# Patient Record
Sex: MALE | Race: WHITE
[De-identification: names, ages, dates, MRNs, and addresses within clinical notes are randomized per-mention and may not be internally consistent; named-entity substitution may affect disease eponyms.]

---

## 2017-01-21 ENCOUNTER — HOSPITAL ENCOUNTER (OUTPATIENT)
Dept: HOSPITAL 47 - RADPETMAIN | Age: 67
Discharge: HOME | End: 2017-01-21
Payer: MEDICARE

## 2017-01-21 DIAGNOSIS — C34.90: Primary | ICD-10-CM

## 2017-01-21 PROCEDURE — 78815 PET IMAGE W/CT SKULL-THIGH: CPT

## 2017-01-22 NOTE — PE
EXAMINATION TYPE: PET CT fusion skull to thigh

 

DATE OF EXAM: 1/21/2017 1:02 PM

 

COMPARISON: PET CT 9/3/2016

 

HISTORY: Lung cancer   

 

TECHNIQUE:  Following the intravenous administration of 12.62 mCi of F-18 FDG, whole body images are 
performed from the skull base to the midthigh.  Images are reviewed on the computer in the coronal, a
xial, and sagittal planes.  Reconstructed rotating images are created on independent workstation and 
reviewed on the computer.   A localization and attenuation correction CT is performed in conjunction 
with the PET scan.

 

DLP: 257.04 mGycm

 

SCAN: Subsequent Scan

 

Blood glucose: 147 mg/dL

 

Average Mediastinum SUV: 1.1

Average Liver SUV: 1.4

 

FINDINGS: 

NECK:  Suspicious soft tissue uptake is not evident.

 

THORAX: Abnormal uptake is along the posterior lateral wall of the cavitary lesion of the left lung. 
This has an SUV value of 3.0 suspicious for neoplasm. This appears hyperintense on the current examin
ation, a new finding.

 

ABDOMEN AND PELVIS: No abnormal soft tissue uptake.

 

OSSEOUS STRUCTURES: Previous intense uptake within the osseous structures evident on the current exam
ination. There is a small focus of radiotracer posterior to the proximal diaphyseal left femur. PET i
mage 249. This is an SUV value of 2.9. Acute injury could be considered. Given the SUV values neoplas
tic process is not excluded.

 

LOCALIZATION CT: Cavitary lesion posterior medial left midlung is again identified. Some streak opaci
ties in the posterior left lung base without corresponding abnormal uptake

 

COMPARISON: Osseous uptake is markedly diminished over the interval.

 

IMPRESSION:

1. Other remains abnormal uptake around the cavitary lesion posterior medial left midlung compatible 
with neoplasm. The uptake in this region may be increasing from comparison.

2. The previous osseous uptake throughout the axial and appendicular skeleton has resolved. No suspic
ious residual is evident.

3. Small focal area of radiotracer accumulation posterior proximal left diaphyseal femur. Injury infl
ammatory change from the injury could be considered. Neoplasm is not excluded given the SUV value.

## 2017-01-27 ENCOUNTER — HOSPITAL ENCOUNTER (OUTPATIENT)
Dept: HOSPITAL 47 - RADMRIMAIN | Age: 67
Discharge: HOME | End: 2017-01-27
Payer: MEDICARE

## 2017-01-27 DIAGNOSIS — G31.9: ICD-10-CM

## 2017-01-27 DIAGNOSIS — I67.82: ICD-10-CM

## 2017-01-27 DIAGNOSIS — C34.90: Primary | ICD-10-CM

## 2017-01-27 LAB — NON-AFRICAN AMERICAN GFR(MDRD): >60

## 2017-01-27 PROCEDURE — 70553 MRI BRAIN STEM W/O & W/DYE: CPT

## 2017-01-27 PROCEDURE — 82565 ASSAY OF CREATININE: CPT

## 2017-01-27 NOTE — MR
EXAMINATION TYPE: MR brain wo/w con

 

DATE OF EXAM: 1/27/2017 12:54 PM

 

COMPARISON: PET/CT 1/21/2017 and previous MRI 4/8/2016

 

HISTORY: 67 year-old male history of Lung Ca, evaluate for metastatic disease.

 

TECHNIQUE:  Multiplanar, multisequence images of the brain and brainstem were acquired before and aft
er administration of  10 mL IV MultiHance.  Diffusion weighted imaging is performed. 

 

FINDINGS:  

No evidence for acute infarction, hemorrhage, mass, mass effect, midline shift, herniation, effacemen
t of basal cisterns, or extra-axial fluid collection. 

 

The ventricles and sulci are age appropriate with mild generalized atrophy.

 

Major intracranial flow voids are intact.

 

T2/FLAIR weighted sequences show mild scattered burden of right white matter change in the subcortica
l, deep, and periventricular white matter of both cerebral hemispheres. There are approximately 5-10 
foci on each side without significant change.

 

Midline structures demonstrate normal morphology.  The craniocervical junction is normal. 

 

Post contrast images demonstrate no evidence of pathologic enhancement.  Dural venous sinuses are pat
ent.

 

The visualized sinuses are clear and the globes are intact. Leftward nasal septal deviation. Small am
ount of opacification within the right mastoid air cells.

 

 

IMPRESSION:

 

1. No suspicious intracranial enhancement to suggest brain metastases. No abnormality seen on diffusi
on.

2. Stable mild atrophy and changes of chronic small vessel ischemic disease.

3. Partial opacification of right mastoid air cells could represent retained secretions. Correlate fo
r any mastoid pain to exclude mastoiditis.

## 2017-05-20 ENCOUNTER — HOSPITAL ENCOUNTER (OUTPATIENT)
Dept: HOSPITAL 47 - RADPETMAIN | Age: 67
Discharge: HOME | End: 2017-05-20
Payer: MEDICARE

## 2017-05-20 DIAGNOSIS — C34.90: Primary | ICD-10-CM

## 2017-05-20 PROCEDURE — 78815 PET IMAGE W/CT SKULL-THIGH: CPT

## 2017-05-22 NOTE — PE
EXAMINATION TYPE: PET CT fusion skull to thigh

 

DATE OF EXAM: 5/20/2017 2:51 PM

 

CLINICAL HISTORY: 67-year-old male restaging lung cancer, last chemotherapy on 8/30/2016.

 

TECHNIQUE:  Following the intravenous administration of  13.54 mCi of F-18 FDG, whole body images are
 performed from the skull base to the midthigh.  Images are reviewed on the computer in the coronal, 
axial, and sagittal planes.  Reconstructed rotating images are created on independent workstation and
 reviewed on the computer.   A localization and attenuation correction CT is performed in conjunction
 with the PET scan.

 

Glucose level: 88 mg/dL

CTDI- 2.4 mGy

DLP- 213.33 mGy/cm 

 

COMPARISON: 1/21/2017 and 9/3/2016

 

 

FINDINGS: 

 

PET:

There are 2 new AP window lymph nodes, one measuring 1 cm and the second measuring 6 mm, maximal SUV 
8.3.

 

There is additional new left hilar lymph node characterized by intense FDG uptake, maximal SUV 9.6.

 

Redemonstrated cavitary lesion at the superior segment left lower lobe. There has been interval marke
d increase in the soft tissue component along the posterior and medial wall abutting the aorta and le
ft paravertebral region. This measures up to 3.7 cm wide and 2.7 cm AP and shows very intense FDG upt
dante, maximal SUV 16.4.

 

There is a 1.1 cm spiculated pulmonary nodule overlying the left hemidiaphragm that shows increased s
ize but only minimal to mild FDG uptake, max SUV 2.3.

 

A couple 6 mm left lower lobe pulmonary nodules, axial image 114 and 117 are unchanged from 7/9/2016.
  A 4 mm subpleural right middle lobe pulmonary nodule is unchanged as well. 

 

Physiologic FDG uptake within the abdomen and pelvis.

 

At the site of previous increased uptake within the proximal left femur, CT now shows a lytic lesion 
measuring 1.1 cm wide destroying the posterior cortex of the subtrochanteric region. Now intense FDG 
uptake, maximal SUV 17.5 versus 2.9, previously.

 

 

 

ATTENUATION CORRECTION CT:

Paranasal sinuses and mastoid air cells are well pneumatized. Atherosclerotic calcifications within t
he carotid bifurcations. No cervical lymphadenopathy.

 

Heart is normal size without pericardial effusion. Coronary vessel calcifications are present and are
 remarkable for coronary artery disease. Aorta is normal caliber with mild atherosclerotic arch calci
fications and conventional arch vessel branching anatomy.

 

Chronic narrowing of the left lower lobe bronchus. COPD with mild centrilobular emphysema and mild di
ffuse bronchial wall thickening. 

 

No dilated small bowel, free fluid, or free air. No mesenteric or retroperitoneal lymphadenopathy. Mo
derate prostatic calcifications within the abdominal aorta. Left hemicolonic diverticulosis, greatest
 in the sigmoid colon without pericolonic inflammatory change.

 

Mild circumferential bladder wall thickening similar to prior; correlate to exclude cystitis. Patulou
s left inguinal canal. No abnormal fluid collection in the pelvis or pelvic lymphadenopathy seen.

 

Bones: Mild degenerative changes of the hips. Degenerative changes lower lumbar spine.

 

 

IMPRESSION: 

 

1. Disease recurrence with a 3.7 cm intensely hypermetabolic neoplasm arising along the posterior and
 medial wall of the patient's superior segment left lower lobe cavity.

2. Metastatic left hilar and AP window lymphadenopathy is new.

3. Slight enlargement of a 1.1 cm spiculated nodule at the left base which has minimal to mild FDG up
take. Continued followup recommended to exclude an early metachronous lung cancer.

4. Increasing hypermetabolism within the proximal left femur now associated with focal lytic destruct
ion of the subtrochanteric posterior cortex. Finding compatible with osseous metastasis. Note that th
e patient is at risk for impending pathologic fracture and should be made nonweightbearing. Orthopedi
c consultation advised.  An Orange message has been communicated to Sukumar Wilson MD via the [employer] Critical Result system on 5/21/2017 7:41 PM, Message ID [id number].

## 2017-07-25 ENCOUNTER — HOSPITAL ENCOUNTER (OUTPATIENT)
Dept: HOSPITAL 47 - RADXRMAIN | Age: 67
Discharge: HOME | End: 2017-07-25
Payer: MEDICARE

## 2017-07-25 DIAGNOSIS — M25.552: ICD-10-CM

## 2017-07-25 DIAGNOSIS — E11.9: ICD-10-CM

## 2017-07-25 DIAGNOSIS — R42: ICD-10-CM

## 2017-07-25 DIAGNOSIS — R51: ICD-10-CM

## 2017-07-25 DIAGNOSIS — C34.32: ICD-10-CM

## 2017-07-25 DIAGNOSIS — M89.8X8: Primary | ICD-10-CM

## 2017-07-25 NOTE — XR
EXAMINATION TYPE: XR femur LT

 

DATE OF EXAM: 7/25/2017

 

CLINICAL HISTORY: pain

 

TECHNIQUE:  Two views of the left femur are obtained.

 

COMPARISON: None.

 

FINDINGS:  There is no acute fracture or dislocation seen of the  femur. There is a lytic lesion with
in the subtrochanteric region of the left femur measuring 2.9 x 1.5 cm compatible with metastatic les
ion. No additional lesions identified within the field-of-view.

 

IMPRESSION:  

Lytic lesion subtrochanteric region left femur.

 

ICD 10 NO FRACTURE, INITIAL EVALUATION

## 2017-09-02 ENCOUNTER — HOSPITAL ENCOUNTER (OUTPATIENT)
Dept: HOSPITAL 47 - RADPETMAIN | Age: 67
Discharge: HOME | End: 2017-09-02
Payer: MEDICARE

## 2017-09-02 DIAGNOSIS — C34.32: Primary | ICD-10-CM

## 2017-09-02 PROCEDURE — 78815 PET IMAGE W/CT SKULL-THIGH: CPT

## 2017-09-03 NOTE — PE
EXAMINATION TYPE: PET CT fusion skull to thigh

 

DATE OF EXAM: 9/2/2017

 

COMPARISON: Prior PET/CT May 20, 2017 and older studies.

 

HISTORY: Lung cancer progress study   completed chemotherapy August 29, 2016

 

TECHNIQUE:  Following the intravenous administration of 14.97 mCi of F-18 FDG, whole body images are 
performed from the skull base to the midthigh.  Images are reviewed on the computer in the coronal, a
xial, and sagittal planes.  Reconstructed rotating images are created on independent workstation and 
reviewed on the computer.   A localization and attenuation correction CT is performed in conjunction 
with the PET scan.

 

SCAN: Subsequent Scan

 

FINDINGS: 

 

SKULL BASE AND NECK:  No suspicious hypermetabolic uptake is seen in the neck on current study.

 

CHEST, MEDIASTINUM, AND HILAR REGION: There are persistent abnormal lymph nodes in the AP window. Lar
gest better visualize lymph node measures 1.4 x 0.8 cm on axial image 86 is not significantly changed
 in size from prior study, max SUV is 4.24 on current study.

 

Previously visualized hypermetabolic left hilar lymph node on axial image 93 is less prominent withou
t abnormal hypermetabolic uptake on current study near axial image 96.

 

There is persistent medial superior left lower lobe hypermetabolic lesion near axial image 91 posteri
or to descending aorta invading pleura measuring roughly 1.8 x 1.7 cm fairly stable in size with dimi
nished hypermetabolic uptake versus prior. Max SUV is 6.55. There is persistent curvilinear hypermeta
bolic uptake inferior to this axial image 97 which now shows adjacent ossific destruction suggesting 
progression. In addition there is new lytic hypermetabolic lesion in the left lateral thoracic verteb
ra on axial image 91 .

 

There is stable 6 x 7 mm spiculated ametabolic left lower lobe nodule on axial image 117. There is st
able ametabolic 4 mm subpleural nodule left lower lobe on axial image 119. There is stable ametabolic
 9 mm left basilar nodule on axial image 130. There is stable ametabolic 4 mm subpleural nodule right
 middle lobe on axial image 106. There is background mild to borderline moderate emphysematous change
 redemonstrated.

 

ABDOMEN AND PELVIS: No suspicious hypermetabolic uptake is seen in the abdomen or pelvis. Sigmoid col
onic diverticulosis is present. There is some wall thickening in the mid sigmoid colon with mild hype
rmetabolic uptake near axial image 216, an acute diverticulitis cannot be excluded at this level. Cli
nical correlation is advised.

 

OSSEOUS STRUCTURES: There is artifact from fixation hardware in the left proximal femur. Mild surroun
ding inflammatory changes likely product of recent surgery at this level. Hypermetabolic focus poster
ior cortex near image 240 is redemonstrated with diminished max SUV from prior study noted. Max SUV i
s 5.13 on current study.

 

OTHER CT: There is moderate to severe calcified plaque at bilateral carotid bulbs redemonstrated.

 

There is redemonstration of coronary artery calcification which is noted marker for coronary artery d
isease.

 

There is redemonstration of bilateral gynecomastia.

 

There is fairly moderate to severe calcified plaque of the abdominal aorta extending into pelvic bran
ch vessels.

 

There is felt stable mild concentric wall thickening of bladder.

 

There is prominent multilevel facet arthropathy in the lower lumbar spine. There is multilevel spurri
ng in the thoracic spine noted.

 

IMPRESSION: 

1. Overall mixed response as detailed above in the thorax. Some areas of improvement and progression 
noted.

2. Probable mild to moderate acute sigmoid colonic diverticulitis. Clinical correlation advised.

 

A Yellow message has been communicated to Sukumar Wilson MD via the [employer] Critical Result
 system on 9/3/2017 7:58 AM, Message ID [id number].

## 2017-12-09 ENCOUNTER — HOSPITAL ENCOUNTER (OUTPATIENT)
Dept: HOSPITAL 47 - RADPETMAIN | Age: 67
Discharge: HOME | End: 2017-12-09
Payer: MEDICARE

## 2017-12-09 DIAGNOSIS — Z53.9: Primary | ICD-10-CM

## 2018-03-10 ENCOUNTER — HOSPITAL ENCOUNTER (OUTPATIENT)
Dept: HOSPITAL 47 - RADPETMAIN | Age: 68
End: 2018-03-10
Payer: MEDICARE

## 2018-03-10 DIAGNOSIS — C34.32: Primary | ICD-10-CM

## 2018-03-10 PROCEDURE — 78815 PET IMAGE W/CT SKULL-THIGH: CPT

## 2018-03-10 NOTE — PE
EXAMINATION TYPE: PET CT fusion skull to thigh

 

DATE OF EXAM: 3/10/2018

 

CLINICAL HISTORY: Lung cancer progress study. Completed chemotherapy May 7, 2018.

 

TECHNIQUE:  Following the intravenous administration of  13.57 mCi of F-18 FDG, whole body images are
 performed from the skull base to the midthigh.  Images are reviewed on the computer in the coronal, 
axial, and sagittal planes.  Reconstructed rotating images are created on independent workstation and
 reviewed on the computer.   A non-contrast CT is performed in conjunction with the PET scan.

 

COMPARISON: Prior PET/CT December 16, 2017 and older studies.

 

Subsequent scan

 

FINDINGS: 

SKULL BASE AND NECK:  No suspicious hypermetabolic uptake is seen in the neck on current study.

 

CHEST, MEDIASTINUM, AND HILAR REGION: There is background mild to moderate underlying emphysematous c
hange redemonstrated. Subcentimeter AP window lymph nodes axial image 85 are unchanged in size and ap
pearance without suspicious hypermetabolic uptake on current study stable from most recent prior.

 

There is persistent curvilinear soft tissue left periaortic region axial image 90 measuring 1.5 x 1.7
 cm with increased hypermetabolic uptake, max SUV is 7.1. There is adjacent possibly contiguous 11 x 
9 mm spiculated nodule or nodular consolidation that is more prominent from prior exam measuring 1.3 
x 1.1 cm axial image 89, max SUV at this level is 4.73. Both findings are consistent with disease pro
gression. This is immediately posterior to a thin-walled cyst or cystic lesion. There is a new small 
focal area of hypermetabolic pleural nodular thickening axial image 94 Max SUV of 4.46 not clearly se
en on prior study. 

 

No additional areas of suspicious hypermetabolic uptake identified on current study.

 

ABDOMEN AND PELVIS: No suspicious hypermetabolic uptake is seen on current study.

 

OSSEOUS STRUCTURES: No suspicious hypermetabolic uptake is present with particular attention to left 
proximal femur at area of prior suspicious lesion on older studies.

 

OTHER CT: Moderate to severe calcified plaque in bilateral carotid bulbs remains present, left greate
r than right. 

 

There is three-vessel coronary artery calcification redemonstrated which is noted marker for coronary
 artery disease. 

 

There is improved left basilar linear scarring and/or atelectasis. Bilateral gynecomastia is again se
en. 

 

There is fairly moderate to severe calcified plaque of the aorta extending into the iliac branch vess
els redemonstrated. 

 

There are scattered colonic diverticula most prominent in the left and sigmoid colon. No convincing C
T evidence of acute diverticulitis currently. 

 

Multilevel spurring in the spine is redemonstrated. There is facet arthropathy mid to lower lumbar le
vels redemonstrated. Metallic hardware from left femur internal fixation is redemonstrated.

 

IMPRESSION: Overall negative response with increase in size and abnormal hypermetabolic uptake of lef
t suprahilar posterior local malignancy as detailed above. No new metastatic disease however is prese
nt.

## 2018-06-09 ENCOUNTER — HOSPITAL ENCOUNTER (OUTPATIENT)
Dept: HOSPITAL 47 - RADPETMAIN | Age: 68
End: 2018-06-09
Payer: MEDICARE

## 2018-06-09 DIAGNOSIS — J43.9: ICD-10-CM

## 2018-06-09 DIAGNOSIS — C34.32: Primary | ICD-10-CM

## 2018-06-09 DIAGNOSIS — J98.4: ICD-10-CM

## 2018-06-09 PROCEDURE — 78815 PET IMAGE W/CT SKULL-THIGH: CPT

## 2018-06-11 NOTE — PE
EXAMINATION TYPE: PET CT fusion skull to thigh

 

DATE OF EXAM: 6/9/2018

 

CLINICAL HISTORY: Lung cancer progress study. Completed chemotherapy March 20, 2018

 

TECHNIQUE:  Following the intravenous administration of  15.288 mCi of F-18 FDG, whole body images ar
e performed from the skull base to the midthigh.  Images are reviewed on the computer in the coronal,
 axial, and sagittal planes.  Reconstructed rotating images are created on independent workstation an
d reviewed on the computer.   A non-contrast CT is performed in conjunction with the PET scan.

 

COMPARISON: Prior PET/CT March 10, 2018 and older studies.

 

Subsequent scan 

 

FINDINGS: 

SKULL BASE AND NECK:  No new areas of suspicious hypermetabolic uptake are seen.

 

CHEST, MEDIASTINUM, AND HILAR REGION: Background underlying emphysematous change is redemonstrated. S
table subcentimeter AP window lymph nodes without abnormal hypermetabolic uptake are redemonstrated n
ear axial image 85.

 

There is persistent hypermetabolic curvilinear soft tissue left para-aortic region more prominent or 
enlarged from prior study measuring 3.5 x 2.9 cm axial image 90, max SUV is 6.4 to current study. The
 adjacent spiculated nodule is now contiguous lateral aspect with increasing hypermetabolic uptake ax
ial image 89, max SUV is 6.79 from 4.73 prior study. This is immediately posterior to a thin-walled c
yst. There is enlarging pleural-based focus axial image 97 posterior to aorta now measuring 2.1 x 1.5
 cm axial image 97 with increasing max SUV of 9.63 from 4.46 prior study which is now destroying the 
articulating portion of the left posterior seventh rib.

 

There is suggestion of new subcentimeter spiculated hypermetabolic lesion left lung base axial image 
131 CT with max SUV of 3.85 on PET/CT image 136.

 

No new areas of abnormal hypermetabolic uptake are otherwise seen.

 

ABDOMEN AND PELVIS: There is new 1.2 cm hypermetabolic focus axial image 151 max SUV is 6.44. This co
uld be inferior central crux of left adrenal gland versus left periaortic lymph node.

 

OSSEOUS STRUCTURES: New suspicious lytic hypermetabolic focus posterior aspect of left proximal femur
 subtrochanteric level axial image 245 is noted, max SUV is 6.52.  Mild hypermetabolic uptake intertr
ochanteric level is favored postsurgical superior to this.

 

OTHER CT: Multilevel spurring in the spine is seen. Multilevel facet arthropathy lower lumbar levels 
is seen. There is arthropathy in both shoulders at the AC joints. There is old left clavicular fractu
re redemonstrated.

 

There is moderate to severe calcified plaque bilateral carotid bulbs redemonstrated.

 

There is moderate coronary artery calcification redemonstrated.

 

Diverticula in the sigmoid colon are again seen.

 

IMPRESSION: Findings consistent with neoplastic progression identified. Enlarging and increasing hype
rmetabolic lesions noted left midlung. New left basilar subcentimeter lesion, lesion region of inferi
or left adrenal gland, and new osseous metastatic lesion subtrochanteric level left hip also all note
d.

 

EORTC response criteria:| Progressive Metabolic Disease. Increase in MaxSUV > 25% 

 

Primary tumor response WHO category:  PD - At least 20% increase in longest recordable dimension of t
umor

## 2018-10-27 ENCOUNTER — HOSPITAL ENCOUNTER (OUTPATIENT)
Dept: HOSPITAL 47 - RADPETMAIN | Age: 68
Discharge: HOME | End: 2018-10-27
Attending: INTERNAL MEDICINE
Payer: MEDICARE

## 2018-10-27 DIAGNOSIS — C34.32: ICD-10-CM

## 2018-10-27 DIAGNOSIS — C79.51: Primary | ICD-10-CM

## 2018-10-27 PROCEDURE — 78815 PET IMAGE W/CT SKULL-THIGH: CPT

## 2018-10-28 NOTE — PE
EXAMINATION TYPE: PET CT fusion skull to thigh

 

DATE OF EXAM: 10/27/2018

 

COMPARISON: Prior PET/CT June 19, 2018 and older studies.

 

HISTORY: Lung cancer progress study.  Completed immunotherapy October 25, 2018. Originally diagnosed 
2016. 

 

TECHNIQUE:  Following the intravenous administration of 13.98 mCi of F-18 FDG, whole body images are 
performed from the skull base to the midthigh.  Images are reviewed on the computer in the coronal, a
xial, and sagittal planes.  Reconstructed rotating images are created on independent workstation and 
reviewed on the computer.   A noncontrast CT is performed in conjunction with the PET scan.

 

SCAN: Subsequent Scan

 

FINDINGS: 

 

SKULL BASE AND NECK:  No new areas of suspicious hypermetabolic uptake are seen.

 

CHEST, MEDIASTINUM, AND HILAR REGION: A background of moderate underlying emphysematous change most p
rominent in lung apices is redemonstrated.

 

There is progression in curvilinear soft tissue left para-aortic region now measuring roughly 4.9 x 3
.1 cm axial image 104 with some destruction of the posterior seventh rib and invasion into the adjace
nt neural foramina abutting spinal canal, max SUV is 10.05 on current study. There is destruction of 
posterior left aspect of the T7 vertebra on axial image 107 and left T7 transverse process. Soft tiss
ue extends to level of spiculated 2.0 x 1.7 cm nodule superiorly axial image 100 max SUV at this leve
l of 4.31. Craniocaudal length of neoplasm is roughly 6 cm.

 

There is stable ametabolic 5 mm subpleural nodule left lung base axial image 121. Left basilar scarri
ng near axial image 135 is stable without progression or hypermetabolic uptake. No new areas of abnor
mal hypermetabolic uptake are seen. There is enlarging 8 x 5 mm left paravertebral nodule or mass wit
h mild hypermetabolic uptake, this needs to be followed axial image 131.

 

ABDOMEN AND PELVIS: There is new hypermetabolic 9 x 8 mm retrocrural lymph node just posterior to aor
ta just past diaphragmatic ramona axial image 146, max SUV is 4.25. Posterior to the spleen there is ne
w hypermetabolic just below diaphragm measuring 3.5 x 1.1 cm axial image 148, max SUV is 5.33.

 

There is interval progression in size of left para-aortic lymph node now measuring 1.8 x 1.4 cm on ax
ial image 159, max SUV is 4.76.

 

OSSEOUS STRUCTURES: There is further enlargement of posterior suspicious lytic hypermetabolic lesion 
left proximal femur subtrochanteric level axial image 255, max SUV is 8.7 on current study. Metallic 
hardware redemonstrated. Mild hypermetabolic uptake proximal to this is redemonstrated felt stable.

 

There is suggestion of new hypermetabolic osseous lesion left T12 level axial image 149 without defin
itive CT lytic or sclerotic lesion.

 

OTHER CT: Multilevel spurring in the spine is seen. Multilevel facet arthropathy lower lumbar levels 
is redemonstrated. There is arthropathy in both shoulders at the AC joints. There is old left clavicu
lar fracture redemonstrated. 

 

There is moderate to severe calcified plaque bilateral carotid bulbs redemonstrated. 

 

There is moderate coronary artery calcification redemonstrated. 

 

Diverticula in the sigmoid colon are again seen.

 

 

IMPRESSION: Findings consistent with neoplastic progression redemonstrated. Increasing size of conflu
ent destructive posterior left mid thoracic lesion now invading spinal canal. New nodules and/or rhoda
opathy in the upper to midabdomen. Progression of osseous metastatic disease.

 

A Yellow [employer] critical message alert has been initiated for Sukumar Wilson MD via the [employer] | Critical Results System on 10/28/2018 6:14 AM.  This message alert has been sent to Sukumar Wilson MD via the preferences provided by the clinician for the receipt of Radiology Critical Findings. Me
ssage ID [id number].

## 2018-12-05 ENCOUNTER — HOSPITAL ENCOUNTER (INPATIENT)
Dept: HOSPITAL 47 - 3NMEDONC | Age: 68
LOS: 3 days | Discharge: HOME HEALTH | DRG: 809 | End: 2018-12-08
Attending: INTERNAL MEDICINE | Admitting: INTERNAL MEDICINE
Payer: MEDICARE

## 2018-12-05 VITALS — BODY MASS INDEX: 17.1 KG/M2

## 2018-12-05 DIAGNOSIS — I10: ICD-10-CM

## 2018-12-05 DIAGNOSIS — C77.1: ICD-10-CM

## 2018-12-05 DIAGNOSIS — Z96.1: ICD-10-CM

## 2018-12-05 DIAGNOSIS — T45.1X5A: ICD-10-CM

## 2018-12-05 DIAGNOSIS — R13.10: ICD-10-CM

## 2018-12-05 DIAGNOSIS — M47.9: ICD-10-CM

## 2018-12-05 DIAGNOSIS — F17.210: ICD-10-CM

## 2018-12-05 DIAGNOSIS — Z98.41: ICD-10-CM

## 2018-12-05 DIAGNOSIS — Z79.899: ICD-10-CM

## 2018-12-05 DIAGNOSIS — E86.0: ICD-10-CM

## 2018-12-05 DIAGNOSIS — M19.012: ICD-10-CM

## 2018-12-05 DIAGNOSIS — Z98.42: ICD-10-CM

## 2018-12-05 DIAGNOSIS — E11.40: ICD-10-CM

## 2018-12-05 DIAGNOSIS — H93.13: ICD-10-CM

## 2018-12-05 DIAGNOSIS — C34.90: ICD-10-CM

## 2018-12-05 DIAGNOSIS — R39.11: ICD-10-CM

## 2018-12-05 DIAGNOSIS — M19.011: ICD-10-CM

## 2018-12-05 DIAGNOSIS — G93.49: ICD-10-CM

## 2018-12-05 DIAGNOSIS — D61.810: ICD-10-CM

## 2018-12-05 DIAGNOSIS — B37.0: ICD-10-CM

## 2018-12-05 DIAGNOSIS — R30.0: ICD-10-CM

## 2018-12-05 DIAGNOSIS — C79.51: ICD-10-CM

## 2018-12-05 DIAGNOSIS — J44.9: ICD-10-CM

## 2018-12-05 DIAGNOSIS — Z88.1: ICD-10-CM

## 2018-12-05 DIAGNOSIS — R50.81: ICD-10-CM

## 2018-12-05 DIAGNOSIS — Z79.84: ICD-10-CM

## 2018-12-05 DIAGNOSIS — Z86.010: ICD-10-CM

## 2018-12-05 DIAGNOSIS — D70.1: Primary | ICD-10-CM

## 2018-12-05 DIAGNOSIS — Z82.49: ICD-10-CM

## 2018-12-05 LAB
ALBUMIN SERPL-MCNC: 2.8 G/DL (ref 3.5–5)
ALP SERPL-CCNC: 103 U/L (ref 38–126)
ALT SERPL-CCNC: 34 U/L (ref 21–72)
ANION GAP SERPL CALC-SCNC: 8 MMOL/L
AST SERPL-CCNC: 22 U/L (ref 17–59)
BUN SERPL-SCNC: 14 MG/DL (ref 9–20)
CALCIUM SPEC-MCNC: 7.6 MG/DL (ref 8.4–10.2)
CHLORIDE SERPL-SCNC: 101 MMOL/L (ref 98–107)
CO2 SERPL-SCNC: 24 MMOL/L (ref 22–30)
ERYTHROCYTE [DISTWIDTH] IN BLOOD BY AUTOMATED COUNT: 3.33 M/UL (ref 4.3–5.9)
ERYTHROCYTE [DISTWIDTH] IN BLOOD: 12.8 % (ref 11.5–15.5)
GLUCOSE BLD-MCNC: 159 MG/DL (ref 75–99)
GLUCOSE SERPL-MCNC: 172 MG/DL (ref 74–99)
HCT VFR BLD AUTO: 31.4 % (ref 39–53)
HGB BLD-MCNC: 10 GM/DL (ref 13–17.5)
MAGNESIUM SPEC-SCNC: 1.5 MG/DL (ref 1.6–2.3)
MCH RBC QN AUTO: 29.9 PG (ref 25–35)
MCHC RBC AUTO-ENTMCNC: 31.7 G/DL (ref 31–37)
MCV RBC AUTO: 94.3 FL (ref 80–100)
PLATELET # BLD AUTO: 245 K/UL (ref 150–450)
POTASSIUM SERPL-SCNC: 3 MMOL/L (ref 3.5–5.1)
PROT SERPL-MCNC: 5.3 G/DL (ref 6.3–8.2)
SODIUM SERPL-SCNC: 133 MMOL/L (ref 137–145)
WBC # BLD AUTO: 0.9 K/UL (ref 3.8–10.6)

## 2018-12-05 PROCEDURE — 84100 ASSAY OF PHOSPHORUS: CPT

## 2018-12-05 PROCEDURE — 87040 BLOOD CULTURE FOR BACTERIA: CPT

## 2018-12-05 PROCEDURE — 74230 X-RAY XM SWLNG FUNCJ C+: CPT

## 2018-12-05 PROCEDURE — 81003 URINALYSIS AUTO W/O SCOPE: CPT

## 2018-12-05 PROCEDURE — 80048 BASIC METABOLIC PNL TOTAL CA: CPT

## 2018-12-05 PROCEDURE — 71046 X-RAY EXAM CHEST 2 VIEWS: CPT

## 2018-12-05 PROCEDURE — 83735 ASSAY OF MAGNESIUM: CPT

## 2018-12-05 PROCEDURE — 85025 COMPLETE CBC W/AUTO DIFF WBC: CPT

## 2018-12-05 PROCEDURE — 84132 ASSAY OF SERUM POTASSIUM: CPT

## 2018-12-05 PROCEDURE — 70553 MRI BRAIN STEM W/O & W/DYE: CPT

## 2018-12-05 PROCEDURE — 83605 ASSAY OF LACTIC ACID: CPT

## 2018-12-05 PROCEDURE — 80053 COMPREHEN METABOLIC PANEL: CPT

## 2018-12-05 RX ADMIN — SODIUM CHLORIDE ONE MLS/HR: 9 INJECTION, SOLUTION INTRAVENOUS at 19:47

## 2018-12-05 RX ADMIN — TIMOLOL MALEATE SCH DROPS: 5 SOLUTION OPHTHALMIC at 21:17

## 2018-12-05 RX ADMIN — LATANOPROST SCH DROPS: 50 SOLUTION OPHTHALMIC at 21:17

## 2018-12-05 RX ADMIN — POTASSIUM CHLORIDE SCH MEQ: 20 TABLET, EXTENDED RELEASE ORAL at 21:17

## 2018-12-05 RX ADMIN — CEFAZOLIN SCH MLS/HR: 330 INJECTION, POWDER, FOR SOLUTION INTRAMUSCULAR; INTRAVENOUS at 20:07

## 2018-12-05 RX ADMIN — GABAPENTIN SCH MG: 300 CAPSULE ORAL at 21:17

## 2018-12-05 RX ADMIN — FILGRASTIM-SNDZ SCH MCG: 300 INJECTION, SOLUTION INTRAVENOUS; SUBCUTANEOUS at 18:25

## 2018-12-05 RX ADMIN — SODIUM CHLORIDE ONE MLS/HR: 9 INJECTION, SOLUTION INTRAVENOUS at 20:07

## 2018-12-05 RX ADMIN — MAGNESIUM SULFATE IN DEXTROSE SCH MLS/HR: 10 INJECTION, SOLUTION INTRAVENOUS at 22:50

## 2018-12-05 RX ADMIN — VERAPAMIL HYDROCHLORIDE SCH MG: 40 TABLET, FILM COATED ORAL at 21:17

## 2018-12-05 RX ADMIN — POTASSIUM CHLORIDE SCH MEQ: 20 TABLET, EXTENDED RELEASE ORAL at 22:50

## 2018-12-05 RX ADMIN — MAGNESIUM SULFATE IN DEXTROSE SCH MLS/HR: 10 INJECTION, SOLUTION INTRAVENOUS at 21:17

## 2018-12-05 NOTE — XR
EXAMINATION TYPE: XR chest 2V

 

DATE OF EXAM: 12/5/2018

 

COMPARISON: 8/26/2016

 

HISTORY: Cough

 

TECHNIQUE:  Frontal and lateral views of the chest are obtained.

 

FINDINGS:  There is no heart failure nor confluent pneumonic infiltrate. Costophrenic angles are shazia
r. Heart size is normal. There is mild spurring in the thoracic spine. There is old left healed clavi
veronica fracture.

 

IMPRESSION:  No active cardiopulmonary disease. No change.

## 2018-12-05 NOTE — P.HPIM
History of Present Illness


H&P Date: 18


Chief Complaint: dysuria, febrile neutropenia





Mr Munoz presented to office 18 s/p 1st taxotere treatment for 

metastatic squamous cell lung cancer for f/u CBC, he found to be neutropenic 

with c/o sore ears, dry mouth, nausea, anorexia, congested cough, urinary 

hesitation and dysuria, weakness, tired and persistent congested cough.  Denies 

chills, rigors, chest pain, CHELSIE, abd pain or cramping, stools have been soft, 

no diarrhea, lower extremity swelling.  Patient directly admitted to the 

hospital for pancultures, supportive care, hydration and further lab workup.  





Patient was diagnosed initially in 2016 with metastatic squamous cell 

carcinoma, metastases to mediastinal lymph nodes and left femur. He has had 

multiple lines of treatment over the year.  His most recent PET scan in 

2018 showed progression in the bones.  Patient received his first 

Taxotere last week.





Review of Systems





14 point ROS as stated in HPI   





Past Medical History


Past Medical History: Cancer, COPD, Diabetes Mellitus, Hypertension


Additional Past Medical History / Comment(s): NIDDM, tinnitis bilaterally, 

arthritis shoulders and back, fx L clavicle (never healed properly), colon 

polyps-benign,  lung cancer


History of Any Multi-Drug Resistant Organisms: None Reported


Past Surgical History: Orthopedic Surgery


Additional Past Surgical History / Comment(s):  colonoscopy with polypectomy

,  colonoscopy, R ankle fracture with pin, bilateral cataract removal with 

lens implants.


Past Anesthesia/Blood Transfusion Reactions: No Reported Reaction


Past Psychological History: No Psychological Hx Reported


Smoking Status: Current every day smoker


Past Alcohol Use History: Daily


Past Drug Use History: None Reported





- Past Family History


  ** Father


Family Medical History: Prostate Disorder





  ** Mother


Family Medical History: Myocardial Infarction (MI)


Additional Family Medical History / Comment(s): Mother  of a MI pt unsure 

at what age.





Medications and Allergies


 Home Medications











 Medication  Instructions  Recorded  Confirmed  Type


 


Glimepiride [Amaryl] 1 mg PO AC-BRKFST 04/12/16 08/15/16 History


 


Latanoprost Ophth [Xalatan 0.005%] 1 drops BOTH EYES HS 04/12/16 08/15/16 

History


 


Lisinopril [Zestril] 20 mg PO DAILY 04/12/16 08/15/16 History


 


Timolol 0.5% Ophth Soln [Timoptic 1 drop BOTH EYES BID 04/12/16 08/15/16 History





0.5% Ophth Soln]    


 


ALPRAZolam [Xanax] 0.5 mg PO Q8H PRN #20 tab 04/20/16 08/15/16 Rx


 


Verapamil [Isoptin] 40 mg PO TID #90 tab 04/20/16 08/15/16 Rx


 


Acetaminophen Tab [Tylenol] 650 mg PO Q4H PRN 08/05/16 08/15/16 History


 


Hydrocodone/Acetaminophen [Norco 1 tab PO Q4H PRN 08/05/16 08/15/16 History





5-325]    


 


Multivitamins, Thera [Multivitamin 1 tab PO DAILY 08/05/16 08/15/16 History





(formulary)]    


 


Thiamine [Vitamin B-1] 100 mg PO DAILY 08/05/16 08/15/16 History


 


Calcium Carbonate [Calcium] 600 mg PO DAILY 08/15/16 08/15/16 History


 


Gabapentin [Neurontin] 300 mg PO TID #90 cap 16  Rx


 


Ondansetron HCl [Zofran] 4 mg PO Q8HR PRN #20 tab 16  Rx


 


Topiramate [Topamax] 25 mg PO DAILY #30 tab 16  Rx











 Allergies











Allergy/AdvReac Type Severity Reaction Status Date / Time


 


doxycycline Allergy  Rash/Hives Verified 16 07:32














Physical Exam


Vitals: 


 Intake and Output











 18





 06:59 14:59 22:59


 


Other:   


 


  Weight   46.72 kg














- Constitutional


General appearance: cooperative, no acute distress, thin





- EENT





bilateral bulging TM, no purulence or blood


Eyes: anicteric sclerae, EOMI


ENT: hearing grossly normal





- Neck


Neck: no lymphadenopathy





- Respiratory


Respiratory: bilateral: diminished, rales (anterior on the right)





- Cardiovascular


Rhythm: regular


Heart sounds: normal: S1, S2


Abnormal Heart Sounds: no systolic murmur, no diastolic murmur, no rub, no S3 

Gallop, no S4 Gallop, no click, no other


  ** leg


Peripheral Edema: bilateral: None





- Gastrointestinal


General gastrointestinal: no absent bowel sounds, no decreased bowel sounds, no 

distended, no hepatomegaly, no hyperactive bowel sounds, normal bowel sounds, 

no organomegaly, no rigid, scaphoid, soft, no splenomegaly, no tenderness, no 

umbilical hernia, no ventral hernia





- Neurologic


Neurologic: CNII-XII intact





- Musculoskeletal


Musculoskeletal: generalized weakness





- Psychiatric


Psychiatric: A&O x's 3, appropriate affect, intact judgment & insight





Thrombosis Risk Factor Assmnt





- DVT/VTE Prophylaxis


DVT/VTE Prophylaxis: Mechanical Prophylaxis ordered





Assessment and Plan


(1) Febrile neutropenia


Narrative/Plan: 


Pancultures ordered, vanco pharmacy to dose. VS Q 4 hours. CBC daily. 


Current Visit: Yes   Status: Acute   Priority: High   Code(s): D70.9 - 

NEUTROPENIA, UNSPECIFIED; R50.81 - FEVER PRESENTING WITH CONDITIONS CLASSIFIED 

ELSEWHERE   SNOMED Code(s): 951532888


   





(2) Dehydration


Narrative/Plan: 


IV fluids ordered.


Current Visit: Yes   Status: Acute   Priority: High   Code(s): E86.0 - 

DEHYDRATION   SNOMED Code(s): 50345859


   





(3) Diabetic neuropathy


Narrative/Plan: 


continue home medications


Current Visit: No   Status: Acute   Priority: High   Code(s): E11.40 - TYPE 2 

DIABETES MELLITUS WITH DIABETIC NEUROPATHY, UNSP   SNOMED Code(s): 237740504


   





(4) Pancytopenia due to antineoplastic chemotherapy


Narrative/Plan: 


G-CSF ordered for neutropenia.





No acute intervention for anemia and thrombocytopenia.  CBC daily.


Current Visit: Yes   Status: Acute   Priority: High   Code(s): D61.810 - 

ANTINEOPLASTIC CHEMOTHERAPY INDUCED PANCYTOPENIA   SNOMED Code(s): 

767282476419008


   





(5) Squamous cell carcinoma of lung


Narrative/Plan: 


Status post first cycle of Taxotere, new treatment for disease progression 

documented in November.  Adjustments will be made to patient's treatment plan 

for prevention of severe neutropenia in the future


Current Visit: No   Status: Acute   Priority: Medium   Code(s): C34.90 - 

MALIGNANT NEOPLASM OF UNSP PART OF UNSP BRONCHUS OR LUNG   SNOMED Code(s): 

534956248


   





(6) Weakness


Narrative/Plan: 


Will have PT/OT evaluate while in the hospital 


Current Visit: No   Status: Acute   Priority: High   Code(s): R53.1 - WEAKNESS 

  SNOMED Code(s): 43981785


   


Plan: 





DVT mechanical prophylaxis


GI prophylaxis 


Home med reconciled


Supporitve meds ordered PRN


Labs ordered.


F/U in AM

## 2018-12-06 LAB
BASOPHILS # BLD MANUAL: 0 K/UL (ref 0–0.2)
BLASTS # BLD MANUAL: 0.01 K/UL
CELLS COUNTED: 100
EOSINOPHIL # BLD MANUAL: 0.01 K/UL (ref 0–0.7)
ERYTHROCYTE [DISTWIDTH] IN BLOOD BY AUTOMATED COUNT: 3.43 M/UL (ref 4.3–5.9)
ERYTHROCYTE [DISTWIDTH] IN BLOOD: 13 % (ref 11.5–15.5)
GLUCOSE BLD-MCNC: 126 MG/DL (ref 75–99)
GLUCOSE BLD-MCNC: 128 MG/DL (ref 75–99)
GLUCOSE BLD-MCNC: 131 MG/DL (ref 75–99)
GLUCOSE BLD-MCNC: 133 MG/DL (ref 75–99)
GLUCOSE UR QL: (no result)
HCT VFR BLD AUTO: 32 % (ref 39–53)
HGB BLD-MCNC: 10.3 GM/DL (ref 13–17.5)
LYMPHOCYTES # BLD MANUAL: 0.42 K/UL (ref 1–4.8)
MAGNESIUM SPEC-SCNC: 2.2 MG/DL (ref 1.6–2.3)
MCH RBC QN AUTO: 30.2 PG (ref 25–35)
MCHC RBC AUTO-ENTMCNC: 32.4 G/DL (ref 31–37)
MCV RBC AUTO: 93.3 FL (ref 80–100)
METAMYELOCYTES # BLD: 0.03 K/UL
MONOCYTES # BLD MANUAL: 0.46 K/UL (ref 0–1)
MYELOCYTES # BLD MANUAL: 0.01 K/UL
NEUTROPHILS NFR BLD MANUAL: 16 %
NEUTS SEG # BLD MANUAL: 0.3 K/UL (ref 1.3–7.7)
PH UR: 6 [PH] (ref 5–8)
PLATELET # BLD AUTO: 233 K/UL (ref 150–450)
POTASSIUM SERPL-SCNC: 4.2 MMOL/L (ref 3.5–5.1)
SP GR UR: 1.01 (ref 1–1.03)
UROBILINOGEN UR QL STRIP: <2 MG/DL (ref ?–2)
WBC # BLD AUTO: 1.3 K/UL (ref 3.8–10.6)

## 2018-12-06 RX ADMIN — CEFAZOLIN SCH MLS/HR: 330 INJECTION, POWDER, FOR SOLUTION INTRAMUSCULAR; INTRAVENOUS at 08:24

## 2018-12-06 RX ADMIN — VERAPAMIL HYDROCHLORIDE SCH MG: 40 TABLET, FILM COATED ORAL at 08:22

## 2018-12-06 RX ADMIN — GABAPENTIN SCH MG: 400 CAPSULE ORAL at 13:33

## 2018-12-06 RX ADMIN — CEFAZOLIN SCH: 330 INJECTION, POWDER, FOR SOLUTION INTRAMUSCULAR; INTRAVENOUS at 04:53

## 2018-12-06 RX ADMIN — GABAPENTIN SCH MG: 300 CAPSULE ORAL at 08:22

## 2018-12-06 RX ADMIN — TIMOLOL MALEATE SCH DROPS: 2.5 SOLUTION OPHTHALMIC at 20:09

## 2018-12-06 RX ADMIN — HYDROCODONE BITARTRATE AND ACETAMINOPHEN PRN EACH: 5; 325 TABLET ORAL at 08:22

## 2018-12-06 RX ADMIN — CITALOPRAM HYDROBROMIDE SCH MG: 20 TABLET ORAL at 11:31

## 2018-12-06 RX ADMIN — GLIMEPIRIDE SCH MG: 1 TABLET ORAL at 08:22

## 2018-12-06 RX ADMIN — LATANOPROST SCH DROPS: 50 SOLUTION OPHTHALMIC at 20:09

## 2018-12-06 RX ADMIN — Medication SCH: at 13:12

## 2018-12-06 RX ADMIN — GABAPENTIN SCH MG: 400 CAPSULE ORAL at 21:35

## 2018-12-06 RX ADMIN — ACETAMINOPHEN SCH ML: 160 SOLUTION ORAL at 17:21

## 2018-12-06 RX ADMIN — VERAPAMIL HYDROCHLORIDE SCH MG: 40 TABLET, FILM COATED ORAL at 15:28

## 2018-12-06 RX ADMIN — FILGRASTIM-SNDZ SCH MCG: 300 INJECTION, SOLUTION INTRAVENOUS; SUBCUTANEOUS at 17:21

## 2018-12-06 RX ADMIN — NICOTINE SCH PATCH: 21 PATCH, EXTENDED RELEASE TRANSDERMAL at 11:31

## 2018-12-06 RX ADMIN — CALCIUM CARBONATE (ANTACID) CHEW TAB 500 MG SCH MG: 500 CHEW TAB at 11:31

## 2018-12-06 RX ADMIN — GABAPENTIN SCH: 400 CAPSULE ORAL at 09:40

## 2018-12-06 RX ADMIN — TIMOLOL MALEATE SCH: 2.5 SOLUTION OPHTHALMIC at 09:40

## 2018-12-06 RX ADMIN — CEFAZOLIN SCH MLS/HR: 330 INJECTION, POWDER, FOR SOLUTION INTRAMUSCULAR; INTRAVENOUS at 15:28

## 2018-12-06 RX ADMIN — APIXABAN SCH MG: 2.5 TABLET, FILM COATED ORAL at 11:31

## 2018-12-06 RX ADMIN — TIMOLOL MALEATE SCH DROPS: 5 SOLUTION OPHTHALMIC at 08:22

## 2018-12-06 RX ADMIN — GABAPENTIN SCH MG: 400 CAPSULE ORAL at 17:21

## 2018-12-06 RX ADMIN — ACETAMINOPHEN SCH ML: 160 SOLUTION ORAL at 21:36

## 2018-12-06 RX ADMIN — VERAPAMIL HYDROCHLORIDE SCH MG: 40 TABLET, FILM COATED ORAL at 21:35

## 2018-12-06 RX ADMIN — ACETAMINOPHEN SCH ML: 160 SOLUTION ORAL at 13:33

## 2018-12-06 RX ADMIN — APIXABAN SCH MG: 2.5 TABLET, FILM COATED ORAL at 20:07

## 2018-12-06 RX ADMIN — THERA TABS SCH EACH: TAB at 11:31

## 2018-12-06 NOTE — P.PN
Subjective


Progress Note Date: 12/06/18


Principal diagnosis: 





febrile neutropenia





Pt seen in f/u, he is tired, feeling really weak, no appetite.  T max 100.3F, 

slight chills, mouth is sore, throat is not.  No chest pain, cough is chronic 

and not progressive, thick secretions expectorated, pt does not look at, no abd 

pain, bloating, very small BM, no lwer extremity swelling or pain.  





Objective





- Vital Signs


Vital signs: 


 Vital Signs











Temp  97 F L  12/06/18 16:19


 


Pulse  79   12/06/18 16:19


 


Resp  17   12/06/18 16:19


 


BP  101/67   12/06/18 16:19


 


Pulse Ox  94 L  12/06/18 16:19








 Intake & Output











 12/05/18 12/06/18 12/06/18





 18:59 06:59 18:59


 


Intake Total  1600 800


 


Balance  1600 800


 


Weight 46.72 kg  46.72 kg


 


Intake:   


 


  Intake, IV Titration  1350 800





  Amount   


 


    Magnesium Sulfate-D5w Pmx  200 





    1 gm In Dextrose/Water 1   





    100ml.bag @ 100 mls/hr   





    IVPB Q1H SHARMILA Rx#:   





    [id number]   


 


    Sodium Chloride 0.9% 1,  900 800





    000 ml @ 100 mls/hr IV .   





    Q10H SHARMILA Rx#:[id number]   


 


    Vancomycin 1,000 mg In  250 





    Sodium Chloride 0.9% 250   





    ml @ 125 mls/hr IVPB Q24H   





    SHARMILA Rx#:[id number]   


 


  Oral  250 


 


Other:   


 


  Voiding Method  Toilet 


 


  # Voids  1 


 


  # Bowel Movements  1 














- Constitutional


Constitutional Comment(s): 





frail


General appearance: Present: cooperative, mild distress, thin





- EENT


Eyes: Present: anicteric sclerae, EOMI


ENT: Present: hearing grossly normal, pharyngeal erythema, thrush





- Respiratory


Respiratory: bilateral: diminished





- Cardiovascular


Heart sounds: normal: S1, S2





- Peripheral edema


  ** leg


Peripheral Edema: bilateral: None





- Gastrointestinal


General gastrointestinal: Present: normal bowel sounds, soft.  Absent: absent 

bowel sounds, decreased bowel sounds, distended, hepatomegaly, hyperactive 

bowel sounds, organomegaly, rigid, scaphoid, splenomegaly, tenderness, 

umbilical hernia, ventral hernia





- Integumentary


Integumentary Comment(s): 





nicotine stains on fingers





- Neurologic


Neurologic: Present: CNII-XII intact





- Musculoskeletal


Musculoskeletal: Present: generalized weakness





- Psychiatric


Psychiatric: Present: A&O x's 3, appropriate affect, intact judgment & insight





- Labs


CBC & Chem 7: 


 12/06/18 04:28





 12/06/18 04:28


Labs: 


 Abnormal Lab Results - Last 24 Hours (Table)











  12/05/18 12/05/18 12/05/18 Range/Units





  18:36 18:36 19:59 


 


WBC  0.9 L*    (3.8-10.6)  k/uL


 


RBC  3.33 L    (4.30-5.90)  m/uL


 


Hgb  10.0 L    (13.0-17.5)  gm/dL


 


Hct  31.4 L    (39.0-53.0)  %


 


Neutrophils # (Manual)     (1.3-7.7)  k/uL


 


Lymphocytes # (Manual)     (1.0-4.8)  k/uL


 


Metamyelocytes # (Man)     (0)  k/uL


 


Myelocytes # (Manual)     (0)  k/uL


 


Blast Cells # (Man)     (0)  k/uL


 


Sodium   133 L   (137-145)  mmol/L


 


Potassium   3.0 L   (3.5-5.1)  mmol/L


 


Glucose   172 H   (74-99)  mg/dL


 


POC Glucose (mg/dL)    159 H  (75-99)  mg/dL


 


Calcium   7.6 L   (8.4-10.2)  mg/dL


 


Magnesium   1.5 L   (1.6-2.3)  mg/dL


 


Total Protein   5.3 L   (6.3-8.2)  g/dL


 


Albumin   2.8 L   (3.5-5.0)  g/dL


 


Urine Protein     (Negative)  


 


Urine Glucose (UA)     (Negative)  














  12/06/18 12/06/18 12/06/18 Range/Units





  01:10 04:28 07:07 


 


WBC   1.3 L*   (3.8-10.6)  k/uL


 


RBC   3.43 L   (4.30-5.90)  m/uL


 


Hgb   10.3 L   (13.0-17.5)  gm/dL


 


Hct   32.0 L   (39.0-53.0)  %


 


Neutrophils # (Manual)   0.30 L*   (1.3-7.7)  k/uL


 


Lymphocytes # (Manual)   0.42 L   (1.0-4.8)  k/uL


 


Metamyelocytes # (Man)   0.03 H   (0)  k/uL


 


Myelocytes # (Manual)   0.01 H   (0)  k/uL


 


Blast Cells # (Man)   0.01 H   (0)  k/uL


 


Sodium     (137-145)  mmol/L


 


Potassium     (3.5-5.1)  mmol/L


 


Glucose     (74-99)  mg/dL


 


POC Glucose (mg/dL)    131 H  (75-99)  mg/dL


 


Calcium     (8.4-10.2)  mg/dL


 


Magnesium     (1.6-2.3)  mg/dL


 


Total Protein     (6.3-8.2)  g/dL


 


Albumin     (3.5-5.0)  g/dL


 


Urine Protein  Trace H    (Negative)  


 


Urine Glucose (UA)  1+ H    (Negative)  














  12/06/18 Range/Units





  11:25 


 


WBC   (3.8-10.6)  k/uL


 


RBC   (4.30-5.90)  m/uL


 


Hgb   (13.0-17.5)  gm/dL


 


Hct   (39.0-53.0)  %


 


Neutrophils # (Manual)   (1.3-7.7)  k/uL


 


Lymphocytes # (Manual)   (1.0-4.8)  k/uL


 


Metamyelocytes # (Man)   (0)  k/uL


 


Myelocytes # (Manual)   (0)  k/uL


 


Blast Cells # (Man)   (0)  k/uL


 


Sodium   (137-145)  mmol/L


 


Potassium   (3.5-5.1)  mmol/L


 


Glucose   (74-99)  mg/dL


 


POC Glucose (mg/dL)  126 H  (75-99)  mg/dL


 


Calcium   (8.4-10.2)  mg/dL


 


Magnesium   (1.6-2.3)  mg/dL


 


Total Protein   (6.3-8.2)  g/dL


 


Albumin   (3.5-5.0)  g/dL


 


Urine Protein   (Negative)  


 


Urine Glucose (UA)   (Negative)  














- Imaging and Cardiology


Chest x-ray: report reviewed





Assessment and Plan


(1) Febrile neutropenia


Narrative/Plan: 


CXR and UA not suspicious, blood cultures pending.


Cont vanco, pharmacy to dose. 


VS Q 4 hours. 


CBC daily. Cont GCSF


Current Visit: Yes   Status: Acute   Priority: High   Code(s): D70.9 - 

NEUTROPENIA, UNSPECIFIED; R50.81 - FEVER PRESENTING WITH CONDITIONS CLASSIFIED 

ELSEWHERE   SNOMED Code(s): 940540573


   





(2) Dehydration


Narrative/Plan: 


Cont hydration.  Monitor I&O/oral intake


Current Visit: Yes   Status: Acute   Priority: High   Code(s): E86.0 - 

DEHYDRATION   SNOMED Code(s): 24242637


   





(3) Diabetic neuropathy


Current Visit: No   Status: Acute   Priority: High   Code(s): E11.40 - TYPE 2 

DIABETES MELLITUS WITH DIABETIC NEUROPATHY, UNSP   SNOMED Code(s): 612646294


   





(4) Pancytopenia due to antineoplastic chemotherapy


Narrative/Plan: 


Hgb and platelets stable


Cont GCSF 





Current Visit: Yes   Status: Acute   Priority: High   Code(s): D61.810 - 

ANTINEOPLASTIC CHEMOTHERAPY INDUCED PANCYTOPENIA   SNOMED Code(s): 

256983621552506


   





(5) Squamous cell carcinoma of lung


Narrative/Plan: 


Status post first cycle of Taxotere, new treatment for disease progression 

documented in November.  Adjustments to patient's treatment plan per Primary 

Oncologist. 


Current Visit: No   Status: Acute   Priority: Medium   Code(s): C34.90 - 

MALIGNANT NEOPLASM OF UNSP PART OF UNSP BRONCHUS OR LUNG   SNOMED Code(s): 

172945725


   





(6) Weakness


Narrative/Plan: 


PT/OT ordered


Current Visit: No   Status: Acute   Priority: High   Code(s): R53.1 - WEAKNESS 

  SNOMED Code(s): 20502540


   





(7) Electrolyte abnormality


Narrative/Plan: 


Pt placed on potassium and mag replacement protocol


Current Visit: Yes   Status: Acute   Priority: High   Code(s): E87.8 - OTH 

DISORDERS OF ELECTROLYTE AND FLUID BALANCE, NEC   SNOMED Code(s): 546713796


   





(8) Thrush, oral


Narrative/Plan: 


And mucositis, secondary to chemo.  Oral medications ordered


Current Visit: Yes   Status: Acute   Priority: High   Code(s): B37.0 - CANDIDAL 

STOMATITIS   SNOMED Code(s): 63176877


   





(9) Nicotine dependence


Narrative/Plan: 


Nicotine patch ordered 


Current Visit: Yes   Status: Chronic   Priority: Medium   Code(s): F17.200 - 

NICOTINE DEPENDENCE, UNSPECIFIED, UNCOMPLICATED   SNOMED Code(s): 69484071


   





(10) Atrial fibrillation


Narrative/Plan: 


cont eliquis 


Current Visit: No   Status: Chronic   Priority: Low   Code(s): I48.91 - 

UNSPECIFIED ATRIAL FIBRILLATION   SNOMED Code(s): 33632888


   


Plan: 





GI prophylaxis























Dr attests:  I have performed H&P and developed impression and plan of care, 

discussed with dictator.  I agree with dictated note, documented as a scribe.

## 2018-12-07 LAB
ANION GAP SERPL CALC-SCNC: 5 MMOL/L
BUN SERPL-SCNC: 8 MG/DL (ref 9–20)
CALCIUM SPEC-MCNC: 8 MG/DL (ref 8.4–10.2)
CELLS COUNTED: 100
CHLORIDE SERPL-SCNC: 106 MMOL/L (ref 98–107)
CO2 SERPL-SCNC: 26 MMOL/L (ref 22–30)
EOSINOPHIL # BLD MANUAL: 0.06 K/UL (ref 0–0.7)
ERYTHROCYTE [DISTWIDTH] IN BLOOD BY AUTOMATED COUNT: 3.5 M/UL (ref 4.3–5.9)
ERYTHROCYTE [DISTWIDTH] IN BLOOD: 13 % (ref 11.5–15.5)
GLUCOSE BLD-MCNC: 132 MG/DL (ref 75–99)
GLUCOSE BLD-MCNC: 152 MG/DL (ref 75–99)
GLUCOSE BLD-MCNC: 183 MG/DL (ref 75–99)
GLUCOSE BLD-MCNC: 88 MG/DL (ref 75–99)
GLUCOSE SERPL-MCNC: 91 MG/DL (ref 74–99)
HCT VFR BLD AUTO: 33.5 % (ref 39–53)
HGB BLD-MCNC: 10.4 GM/DL (ref 13–17.5)
LYMPHOCYTES # BLD MANUAL: 0.44 K/UL (ref 1–4.8)
MAGNESIUM SPEC-SCNC: 1.8 MG/DL (ref 1.6–2.3)
MCH RBC QN AUTO: 29.6 PG (ref 25–35)
MCHC RBC AUTO-ENTMCNC: 31 G/DL (ref 31–37)
MCV RBC AUTO: 95.6 FL (ref 80–100)
MONOCYTES # BLD MANUAL: 0.66 K/UL (ref 0–1)
NEUTROPHILS NFR BLD MANUAL: 65 %
NEUTS SEG # BLD MANUAL: 4.3 K/UL (ref 1.3–7.7)
PLATELET # BLD AUTO: 262 K/UL (ref 150–450)
POTASSIUM SERPL-SCNC: 3.7 MMOL/L (ref 3.5–5.1)
SODIUM SERPL-SCNC: 137 MMOL/L (ref 137–145)
WBC # BLD AUTO: 5.5 K/UL (ref 3.8–10.6)

## 2018-12-07 RX ADMIN — SODIUM CHLORIDE SCH MLS/HR: 9 INJECTION, SOLUTION INTRAVENOUS at 12:33

## 2018-12-07 RX ADMIN — ACETAMINOPHEN SCH ML: 160 SOLUTION ORAL at 08:07

## 2018-12-07 RX ADMIN — TIMOLOL MALEATE SCH DROPS: 2.5 SOLUTION OPHTHALMIC at 08:07

## 2018-12-07 RX ADMIN — GABAPENTIN SCH MG: 400 CAPSULE ORAL at 17:58

## 2018-12-07 RX ADMIN — GABAPENTIN SCH MG: 400 CAPSULE ORAL at 12:38

## 2018-12-07 RX ADMIN — Medication SCH MG: at 12:38

## 2018-12-07 RX ADMIN — ONDANSETRON PRN MG: 2 INJECTION INTRAMUSCULAR; INTRAVENOUS at 20:14

## 2018-12-07 RX ADMIN — ACETAMINOPHEN SCH ML: 160 SOLUTION ORAL at 12:38

## 2018-12-07 RX ADMIN — HYDROCODONE BITARTRATE AND ACETAMINOPHEN PRN EACH: 5; 325 TABLET ORAL at 01:47

## 2018-12-07 RX ADMIN — ACETAMINOPHEN SCH ML: 160 SOLUTION ORAL at 21:53

## 2018-12-07 RX ADMIN — GABAPENTIN SCH MG: 400 CAPSULE ORAL at 21:44

## 2018-12-07 RX ADMIN — APIXABAN SCH MG: 2.5 TABLET, FILM COATED ORAL at 08:06

## 2018-12-07 RX ADMIN — NICOTINE SCH PATCH: 21 PATCH, EXTENDED RELEASE TRANSDERMAL at 08:06

## 2018-12-07 RX ADMIN — LATANOPROST SCH DROPS: 50 SOLUTION OPHTHALMIC at 21:53

## 2018-12-07 RX ADMIN — ACETAMINOPHEN SCH ML: 160 SOLUTION ORAL at 17:58

## 2018-12-07 RX ADMIN — VERAPAMIL HYDROCHLORIDE SCH MG: 40 TABLET, FILM COATED ORAL at 08:07

## 2018-12-07 RX ADMIN — VERAPAMIL HYDROCHLORIDE SCH: 40 TABLET, FILM COATED ORAL at 17:54

## 2018-12-07 RX ADMIN — NICOTINE SCH PATCH: 21 PATCH, EXTENDED RELEASE TRANSDERMAL at 12:33

## 2018-12-07 RX ADMIN — GABAPENTIN SCH MG: 400 CAPSULE ORAL at 08:06

## 2018-12-07 RX ADMIN — CEFAZOLIN SCH MLS/HR: 330 INJECTION, POWDER, FOR SOLUTION INTRAMUSCULAR; INTRAVENOUS at 03:35

## 2018-12-07 RX ADMIN — ONDANSETRON PRN MG: 2 INJECTION INTRAMUSCULAR; INTRAVENOUS at 01:48

## 2018-12-07 RX ADMIN — THERA TABS SCH EACH: TAB at 12:38

## 2018-12-07 RX ADMIN — APIXABAN SCH MG: 2.5 TABLET, FILM COATED ORAL at 21:43

## 2018-12-07 RX ADMIN — CEFAZOLIN SCH: 330 INJECTION, POWDER, FOR SOLUTION INTRAMUSCULAR; INTRAVENOUS at 17:54

## 2018-12-07 RX ADMIN — GLIMEPIRIDE SCH MG: 1 TABLET ORAL at 08:06

## 2018-12-07 RX ADMIN — CEFAZOLIN SCH MLS/HR: 330 INJECTION, POWDER, FOR SOLUTION INTRAMUSCULAR; INTRAVENOUS at 18:01

## 2018-12-07 RX ADMIN — CITALOPRAM HYDROBROMIDE SCH MG: 20 TABLET ORAL at 08:06

## 2018-12-07 RX ADMIN — FILGRASTIM-SNDZ SCH MCG: 300 INJECTION, SOLUTION INTRAVENOUS; SUBCUTANEOUS at 18:42

## 2018-12-07 RX ADMIN — HYDROCODONE BITARTRATE AND ACETAMINOPHEN PRN EACH: 5; 325 TABLET ORAL at 21:43

## 2018-12-07 RX ADMIN — CALCIUM CARBONATE (ANTACID) CHEW TAB 500 MG SCH MG: 500 CHEW TAB at 12:39

## 2018-12-07 RX ADMIN — TIMOLOL MALEATE SCH DROPS: 2.5 SOLUTION OPHTHALMIC at 21:44

## 2018-12-07 RX ADMIN — SODIUM CHLORIDE SCH MLS/HR: 9 INJECTION, SOLUTION INTRAVENOUS at 21:54

## 2018-12-07 RX ADMIN — VERAPAMIL HYDROCHLORIDE SCH MG: 40 TABLET, FILM COATED ORAL at 21:43

## 2018-12-07 NOTE — FL
EXAMINATION TYPE: FL barium swallow w video

 

DATE OF EXAM: 12/7/2018

 

COMPARISON: NONE

 

HISTORY: Abnormal bedside examination.

 

TECHNIQUE: Fluoroscopy.

 

FINDINGS:  Fluoroscopic guidance was provided for the procedure performed in conjunction with the [facility] pathology department.  Please see complete report forthcoming from the Speech Pathology departmen
t.  Various consistencies from thin liquid to solids were administered.

 

Fluoroscopy time 2 minutes 49 seconds

Number of images: 0.

 

No aspiration or penetration was evident.

There is prominent pooling within the vallecula with multiple consistencies.

There was normal propulsion of the bolus.

 

IMPRESSION: 

1. Significant residual retained within the vallecula after multiple portions of the examination.

2. No aspiration or penetration was evident during the examination.

## 2018-12-07 NOTE — P.PN
Subjective


Progress Note Date: 12/07/18


Principal diagnosis: 





Febrile neutropenia





Afebrile since admission, increased WBC today


Headaches and complaining of vision changes. 





Objective





- Vital Signs


Vital signs: 


 Vital Signs











Temp  98.6 F   12/07/18 12:00


 


Pulse  83   12/07/18 16:00


 


Resp  16   12/07/18 16:00


 


BP  113/53   12/07/18 12:00


 


Pulse Ox  94 L  12/07/18 12:00








 Intake & Output











 12/06/18 12/07/18 12/07/18





 18:59 06:59 18:59


 


Intake Total 800 1975 490


 


Balance 800 1975 490


 


Weight 46.72 kg 46.72 kg 


 


Intake:   


 


  Intake, IV Titration 800 1975 250





  Amount   


 


    Sodium Chloride 0.9% 1, 800 1600 





    000 ml @ 100 mls/hr IV .   





    Q10H SHARMILA Rx#:[id number]   


 


    Vancomycin 1,000 mg In  375 250





    Sodium Chloride 0.9% 250   





    ml @ 125 mls/hr IVPB Q24H   





    SHARMILA Rx#:[id number]   


 


  Oral   240


 


Other:   


 


  Voiding Method  Toilet Toilet


 


  # Voids  1 2














- Exam





- Constitutional


Constitutional Comment(s): 





frail


General appearance: Present: cooperative, mild distress, thin





- EENT


Eyes: Present: anicteric sclerae, EOMI


ENT: Present: hearing grossly normal, pharyngeal erythema, thrush





- Respiratory


Respiratory: bilateral: diminished





- Cardiovascular


Heart sounds: normal: S1, S2





- Peripheral edema


  ** leg


Peripheral Edema: bilateral: None





- Gastrointestinal


General gastrointestinal: Present: normal bowel sounds, soft.  Absent: absent 

bowel sounds, decreased bowel sounds, distended, hepatomegaly, hyperactive 

bowel sounds, organomegaly, rigid, scaphoid, splenomegaly, tenderness, 

umbilical hernia, ventral hernia





- Integumentary


Integumentary Comment(s): 





nicotine stains on fingers





- Neurologic


Neurologic: Present: CNII-XII intact





- Musculoskeletal


Musculoskeletal: Present: generalized weakness





- Psychiatric


Psychiatric: Present: A&O x's 3, appropriate affect, intact judgment & insight





- Labs


CBC & Chem 7: 


 12/07/18 07:30





 12/07/18 07:30


Labs: 


 Abnormal Lab Results - Last 24 Hours (Table)











  12/06/18 12/06/18 12/07/18 Range/Units





  17:30 20:04 07:30 


 


RBC    3.50 L  (4.30-5.90)  m/uL


 


Hgb    10.4 L  (13.0-17.5)  gm/dL


 


Hct    33.5 L  (39.0-53.0)  %


 


Lymphocytes # (Manual)    0.44 L  (1.0-4.8)  k/uL


 


BUN     (9-20)  mg/dL


 


Creatinine     (0.66-1.25)  mg/dL


 


POC Glucose (mg/dL)  133 H  128 H   (75-99)  mg/dL


 


Calcium     (8.4-10.2)  mg/dL














  12/07/18 12/07/18 Range/Units





  07:30 11:30 


 


RBC    (4.30-5.90)  m/uL


 


Hgb    (13.0-17.5)  gm/dL


 


Hct    (39.0-53.0)  %


 


Lymphocytes # (Manual)    (1.0-4.8)  k/uL


 


BUN  8 L   (9-20)  mg/dL


 


Creatinine  0.62 L   (0.66-1.25)  mg/dL


 


POC Glucose (mg/dL)   132 H  (75-99)  mg/dL


 


Calcium  8.0 L   (8.4-10.2)  mg/dL








 Microbiology - Last 24 Hours (Table)











 12/05/18 20:05 Blood Culture - Preliminary





 Blood    No Growth after 24 hours


 


 12/05/18 18:36 Blood Culture - Preliminary





 Blood    No Growth after 24 hours














Assessment and Plan


Plan: 





(1) Febrile neutropenia


Narrative/Plan: 


 - Afebrile greater than 24 hours


 - WBC improving


 - CXR and UA Neg, blood cultures pending.


 - Cont vanco, pharmacy to dose. 


 - VS Q 8 hours. 


 - CBC daily. Cont GCSF today and recheck CBC in am, possibly dc GCSF in am and 

possible anticipate D/C home in am


Current Visit: Yes   Status: Acute   Priority: High   Code(s): D70.9 - 

NEUTROPENIA, UNSPECIFIED; R50.81 - FEVER PRESENTING WITH CONDITIONS CLASSIFIED 

ELSEWHERE   SNOMED Code(s): 316061170


   





(2) Dehydration


Narrative/Plan: 


 - Cont hydration.  Monitor I&O/oral intake


Current Visit: Yes   Status: Acute   Priority: High   Code(s): E86.0 - 

DEHYDRATION   SNOMED Code(s): 10361984


   





(3) Diabetic neuropathy


Current Visit: No   Status: Acute   Priority: High   Code(s): E11.40 - TYPE 2 

DIABETES MELLITUS WITH DIABETIC NEUROPATHY, UNSP   SNOMED Code(s): 533757698


   





(4) Pancytopenia due to antineoplastic chemotherapy


Narrative/Plan: 


 - Hgb and platelets stable








Current Visit: Yes   Status: Acute   Priority: High   Code(s): D61.810 - 

ANTINEOPLASTIC CHEMOTHERAPY INDUCED PANCYTOPENIA   SNOMED Code(s): 

192783134107100


   





(5) Squamous cell carcinoma of lung


Narrative/Plan: 


 - Status post first cycle of Taxotere, new treatment for disease progression 

documented in November.  


 - Adjustments to patient's treatment plan per Primary Oncologist. 


 - Add Neulasta after next treatment versus dose reduction, defer to Dr. Batista


Current Visit: No   Status: Acute   Priority: Medium   Code(s): C34.90 - 

MALIGNANT NEOPLASM OF [facility] PART OF [facility] BRONCHUS OR LUNG   SNOMED Code(s): 

797471917


   





(6) Weakness


Narrative/Plan: 


 - PT/OT ordered


Current Visit: No   Status: Acute   Priority: High   Code(s): R53.1 - WEAKNESS 

  SNOMED Code(s): 63277134


   





(7) Electrolyte abnormality


Narrative/Plan: 


Pt placed on potassium and mag replacement protocol


Current Visit: Yes   Status: Acute   Priority: High   Code(s): E87.8 - OTH 

DISORDERS OF ELECTROLYTE AND FLUID BALANCE, NEC   SNOMED Code(s): 172046490


   





(8) Thrush, oral


Narrative/Plan: 


 - Oral mucositis, secondary to chemo.  Oral medications ordered


Current Visit: Yes   Status: Acute   Priority: High   Code(s): B37.0 - CANDIDAL 

STOMATITIS   SNOMED Code(s): 02235432


   





(9) Nicotine dependence


Narrative/Plan: 


 - Nicotine patch ordered 


Current Visit: Yes   Status: Chronic   Priority: Medium   Code(s): F17.200 - 

NICOTINE DEPENDENCE, UNSPECIFIED, UNCOMPLICATED   SNOMED Code(s): 16848980


   





(10) Atrial fibrillation


Narrative/Plan: 


 - cont eliquis 


Current Visit: No   Status: Chronic   Priority: Low   Code(s): I48.91 - 

UNSPECIFIED ATRIAL FIBRILLATION   SNOMED Code(s): 28537859





(11) mental Status and Vision Changes:


 - MRI Brain - Concern for metastatic with new changes of mental status, 

fatigue and vision changes. 














GI prophylaxis

## 2018-12-08 VITALS
RESPIRATION RATE: 18 BRPM | TEMPERATURE: 97.1 F | DIASTOLIC BLOOD PRESSURE: 61 MMHG | SYSTOLIC BLOOD PRESSURE: 110 MMHG | HEART RATE: 81 BPM

## 2018-12-08 LAB
ANION GAP SERPL CALC-SCNC: 1 MMOL/L
BUN SERPL-SCNC: 6 MG/DL (ref 9–20)
CALCIUM SPEC-MCNC: 8.1 MG/DL (ref 8.4–10.2)
CELLS COUNTED: 200
CHLORIDE SERPL-SCNC: 110 MMOL/L (ref 98–107)
CO2 SERPL-SCNC: 27 MMOL/L (ref 22–30)
ERYTHROCYTE [DISTWIDTH] IN BLOOD BY AUTOMATED COUNT: 3.47 M/UL (ref 4.3–5.9)
ERYTHROCYTE [DISTWIDTH] IN BLOOD: 13.3 % (ref 11.5–15.5)
GLUCOSE BLD-MCNC: 72 MG/DL (ref 75–99)
GLUCOSE BLD-MCNC: 98 MG/DL (ref 75–99)
GLUCOSE SERPL-MCNC: 72 MG/DL (ref 74–99)
HCT VFR BLD AUTO: 32.7 % (ref 39–53)
HGB BLD-MCNC: 10.2 GM/DL (ref 13–17.5)
LYMPHOCYTES # BLD MANUAL: 0.58 K/UL (ref 1–4.8)
MAGNESIUM SPEC-SCNC: 1.7 MG/DL (ref 1.6–2.3)
MCH RBC QN AUTO: 29.4 PG (ref 25–35)
MCHC RBC AUTO-ENTMCNC: 31.2 G/DL (ref 31–37)
MCV RBC AUTO: 94.2 FL (ref 80–100)
MONOCYTES # BLD MANUAL: 0.73 K/UL (ref 0–1)
MYELOCYTES # BLD MANUAL: 0.29 K/UL
NEUTROPHILS NFR BLD MANUAL: 86 %
NEUTS SEG # BLD MANUAL: 13.1 K/UL (ref 1.3–7.7)
PLATELET # BLD AUTO: 316 K/UL (ref 150–450)
POTASSIUM SERPL-SCNC: 3.7 MMOL/L (ref 3.5–5.1)
SODIUM SERPL-SCNC: 138 MMOL/L (ref 137–145)
WBC # BLD AUTO: 14.5 K/UL (ref 3.8–10.6)

## 2018-12-08 RX ADMIN — VERAPAMIL HYDROCHLORIDE SCH MG: 40 TABLET, FILM COATED ORAL at 09:25

## 2018-12-08 RX ADMIN — GABAPENTIN SCH MG: 400 CAPSULE ORAL at 12:14

## 2018-12-08 RX ADMIN — TIMOLOL MALEATE SCH DROPS: 2.5 SOLUTION OPHTHALMIC at 09:33

## 2018-12-08 RX ADMIN — ACETAMINOPHEN SCH ML: 160 SOLUTION ORAL at 09:27

## 2018-12-08 RX ADMIN — VERAPAMIL HYDROCHLORIDE SCH MG: 40 TABLET, FILM COATED ORAL at 15:34

## 2018-12-08 RX ADMIN — CEFAZOLIN SCH MLS/HR: 330 INJECTION, POWDER, FOR SOLUTION INTRAMUSCULAR; INTRAVENOUS at 15:35

## 2018-12-08 RX ADMIN — CITALOPRAM HYDROBROMIDE SCH MG: 20 TABLET ORAL at 09:25

## 2018-12-08 RX ADMIN — SODIUM CHLORIDE SCH MLS/HR: 9 INJECTION, SOLUTION INTRAVENOUS at 12:09

## 2018-12-08 RX ADMIN — CEFAZOLIN SCH: 330 INJECTION, POWDER, FOR SOLUTION INTRAMUSCULAR; INTRAVENOUS at 09:26

## 2018-12-08 RX ADMIN — ACETAMINOPHEN SCH ML: 160 SOLUTION ORAL at 12:14

## 2018-12-08 RX ADMIN — ACETAMINOPHEN SCH ML: 160 SOLUTION ORAL at 17:14

## 2018-12-08 RX ADMIN — GABAPENTIN SCH MG: 400 CAPSULE ORAL at 09:25

## 2018-12-08 RX ADMIN — GABAPENTIN SCH MG: 400 CAPSULE ORAL at 17:14

## 2018-12-08 RX ADMIN — GLIMEPIRIDE SCH MG: 1 TABLET ORAL at 09:25

## 2018-12-08 RX ADMIN — APIXABAN SCH MG: 2.5 TABLET, FILM COATED ORAL at 09:25

## 2018-12-08 RX ADMIN — CALCIUM CARBONATE (ANTACID) CHEW TAB 500 MG SCH: 500 CHEW TAB at 12:12

## 2018-12-08 RX ADMIN — THERA TABS SCH: TAB at 12:12

## 2018-12-08 RX ADMIN — Medication SCH: at 12:12

## 2018-12-08 NOTE — MR
EXAMINATION TYPE: MR brain wo/w con

 

DATE OF EXAM: 12/8/2018 11:57 AM

 

COMPARISON: NONE

 

HISTORY: Vision Changes, Mental status Changes, metastatic

 

TECHNIQUE:  Multiplanar, multiecho imaging of the brain was obtained with and without intravenous adm
inistration of 4.5 mL intravenous Gadavist.  

 

FINDINGS: Midline structures are unremarkable. There is a normal craniocervical junction.

 

Echoplanar diffusion imaging is normal.

 

There are normal vascular flow voids.

 

The orbits appear normal.

 

There is no evidence of a CP angle mass lesion.

 

There are scattered punctate FLAIR lesions in the subcortical white matter as well as the deep white 
matter tracts of both cerebral hemispheres. These number approximately 7. These are nonspecific. Diff
erential diagnosis includes small vessel disease, demyelination, hypertension, migraine headaches and
 Lyme's disease. There is no mass effect, midline shift or intracranial blood.

 

Following intravenous administration of gadolinium, I do not see evidence of abnormal enhancement.

 

IMPRESSION: 

1. NO ACUTE INTRACRANIAL ABNORMALITY.

2. NONSPECIFIC FLAIR LESIONS AS DESCRIBED. A DIFFERENTIAL DIAGNOSIS IS GIVEN ABOVE.

## 2018-12-08 NOTE — P.DS
Providers


Date of admission: 


12/05/18 17:07





Expected date of discharge: 12/08/18


Attending physician: 


Sahra Batista





Primary care physician: 


[facility] Course: 





Mr. Munoz is a very pleasant 68-year-old gentleman with a history of 

metastatic non-small cell lung cancer, recently started on Taxotere, was 

admitted for febrile neutropenia and dehydration with electrolyte abnormality 

and mucositis.  Pancytopenia, due to chemotherapy, improved with resolution of 

his neutropenia.  Electrolytes were replaced.  Course complicated by difficulty 

swallowing and headaches.  Swallow evaluation negative for aspiration risk.  

MRI brain negative for possible metastases.  Mucositis was slowly improving, 

and he was able to tolerate by mouth intake without significant difficulty.  He 

was discharged in stable condition with instructions to follow up with Dr. Batista in clinic as scheduled.





Patient Condition at Discharge: Stable





Plan - Discharge Summary


Discharge Rx Participant: Yes


New Discharge Prescriptions: 


No Action


   Glimepiride [Amaryl] 1 mg PO AC-BRKFST


   Latanoprost Ophth [Xalatan 0.005%] 1 drops BOTH EYES HS


   Calcium Carbonate [Calcium] 600 mg PO DAILY


   Cholecalciferol [Vitamin D3] 1,000 unit PO DAILY


   Ascorbic Acid [Vitamin C] 500 mg PO DAILY


   Vitamin A Acetate [Vitamin A] 10,000 unit SL DAILY


   Pyridoxine HCl (Vitamin B6) [Vitamin B-6] 100 mg PO DAILY


   Verapamil HCl 120 mg PO DAILY


   Turmeric Root Extract [Turmeric] 500 mg PO DAILY


   Timolol 0.25% Ophth Soln [Timoptic 0.25% Ophth Soln] 1 drop BOTH EYES BID


   Resveratrol 550mg 550 mg PO BID


   Potassium 99 mg PO DAILY


   Potaba 500mg 500 mg PO DAILY


   Omega-3 Fatty Acids/Fish Oil [Fish Oil 1,000 mg Softgel] 1 cap PO BID


   Gabapentin [Neurontin] 400 mg PO QID


   Dronabinol [Marinol] 10 mg PO AC-SUPPER


   Apixaban [Eliquis] 2.5 mg PO BID


   Dexamethasone 8 mg PO AS DIRECTED


   Citalopram Hydrobromide [CeleXA] 20 mg PO DAILY


   Vitamin B Complex 1 cap PO DAILY


   Albuterol Nebulized [Ventolin Nebulized] 2.5 mg INHALATION RT-QID PRN


     PRN Reason: Shortness Of Breath


   acetaZOLAMIDE [Diamox] 250 mg PO BID


   Vitamin E Acetate [Vitamin E] 200 unit PO DAILY


   Mag Hydrox/Al Hydrox/Simeth [Maalox] 5 - 10 ml PO Q6H PRN


     PRN Reason: pain


Discharge Medication List





Glimepiride [Amaryl] 1 mg PO AC-BRKFST 04/12/16 [History]


Latanoprost Ophth [Xalatan 0.005%] 1 drops BOTH EYES HS 04/12/16 [History]


Calcium Carbonate [Calcium] 600 mg PO DAILY 08/15/16 [History]


Albuterol Nebulized [Ventolin Nebulized] 2.5 mg INHALATION RT-QID PRN 12/05/18 [

History]


Apixaban [Eliquis] 2.5 mg PO BID 12/05/18 [History]


Ascorbic Acid [Vitamin C] 500 mg PO DAILY 12/05/18 [History]


Cholecalciferol [Vitamin D3] 1,000 unit PO DAILY 12/05/18 [History]


Citalopram Hydrobromide [CeleXA] 20 mg PO DAILY 12/05/18 [History]


Dexamethasone 8 mg PO AS DIRECTED 12/05/18 [History]


Dronabinol [Marinol] 10 mg PO AC-SUPPER 12/05/18 [History]


Gabapentin [Neurontin] 400 mg PO QID 12/05/18 [History]


Omega-3 Fatty Acids/Fish Oil [Fish Oil 1,000 mg Softgel] 1 cap PO BID 12/05/18 [

History]


Potaba 500mg 500 mg PO DAILY 12/05/18 [History]


Potassium 99 mg PO DAILY 12/05/18 [History]


Pyridoxine HCl (Vitamin B6) [Vitamin B-6] 100 mg PO DAILY 12/05/18 [History]


Resveratrol 550mg 550 mg PO BID 12/05/18 [History]


Timolol 0.25% Ophth Soln [Timoptic 0.25% Ophth Soln] 1 drop BOTH EYES BID 12/05/ 18 [History]


Turmeric Root Extract [Turmeric] 500 mg PO DAILY 12/05/18 [History]


Verapamil HCl 120 mg PO DAILY 12/05/18 [History]


Vitamin A Acetate [Vitamin A] 10,000 unit SL DAILY 12/05/18 [History]


Vitamin B Complex 1 cap PO DAILY 12/05/18 [History]


Vitamin E Acetate [Vitamin E] 200 unit PO DAILY 12/05/18 [History]


acetaZOLAMIDE [Diamox] 250 mg PO BID 12/05/18 [History]


Mag Hydrox/Al Hydrox/Simeth [Maalox] 5 - 10 ml PO Q6H PRN 12/08/18 [History]








Follow up Appointment(s)/Referral(s): 


Jillian York ANPBC [Nurse Practitioner] - 12/12/18 3:30 pm


Discharge Disposition: HOME WITH HOME HEALTH SERVICES





Pending Studies


Pending Results: 





None

## 2018-12-08 NOTE — P.PN
Subjective


Progress Note Date: 12/08/18


Principal diagnosis: 





Metastatic non-small cell lung cancer here for febrile neutropenia








Patient feels a lot better.  Ready to go home.  MRI brain negative.  Swallowing 

evaluation negative for aspiration.





Objective





- Vital Signs


Vital signs: 


 Vital Signs











Temp  97.1 F L  12/08/18 12:28


 


Pulse  81   12/08/18 12:28


 


Resp  18   12/08/18 12:28


 


BP  110/61   12/08/18 12:28


 


Pulse Ox  95   12/08/18 12:28








 Intake & Output











 12/07/18 12/08/18 12/08/18





 18:59 06:59 18:59


 


Intake Total 490 1465 


 


Balance 490 1465 


 


Weight  46.72 kg 


 


Intake:   


 


  Intake, IV Titration 250 925 





  Amount   


 


    Sodium Chloride 0.9% 1,  800 





    000 ml @ 100 mls/hr IV .   





    Q10H SHARMILA Rx#:[id number]   


 


    Vancomycin 1,000 mg In 250  





    Sodium Chloride 0.9% 250   





    ml @ 125 mls/hr IVPB Q24H   





    SHARMILA Rx#:[id number]   


 


    Vancomycin 750 mg In  125 





    Sodium Chloride 0.9% 250   





    ml @ 125 mls/hr IVPB Q12H   





    SHARMILA Rx#:[id number]   


 


  Oral 240 540 


 


Other:   


 


  Voiding Method Toilet Toilet 


 


  # Voids 2 2 1














- Exam





General: In no acute distress.


HEENT: Mucosa moist.


Neck: Neck supple.


Lungs: CTA-B.


Heart:Regular rate.


Abdomen: Soft.


MSK: 4/4 strength in all 4 extremities.


Neuro: Alert and oriented 3.


Skin: No jaundice or rash.


Psych: Appropriate affect.











- Labs


CBC & Chem 7: 


 12/08/18 08:29





 12/08/18 08:29


Labs: 


 Abnormal Lab Results - Last 24 Hours (Table)











  12/07/18 12/08/18 12/08/18 Range/Units





  20:49 08:29 08:29 


 


WBC   14.5 H   (3.8-10.6)  k/uL


 


RBC   3.47 L   (4.30-5.90)  m/uL


 


Hgb   10.2 L   (13.0-17.5)  gm/dL


 


Hct   32.7 L   (39.0-53.0)  %


 


Neutrophils # (Manual)   13.10 H   (1.3-7.7)  k/uL


 


Lymphocytes # (Manual)   0.58 L   (1.0-4.8)  k/uL


 


Myelocytes # (Manual)   0.29 H   (0)  k/uL


 


Chloride    110 H  ()  mmol/L


 


BUN    6 L  (9-20)  mg/dL


 


Glucose    72 L  (74-99)  mg/dL


 


POC Glucose (mg/dL)  152 H    (75-99)  mg/dL


 


Calcium    8.1 L  (8.4-10.2)  mg/dL








 Microbiology - Last 24 Hours (Table)











 12/05/18 20:05 Blood Culture - Preliminary





 Blood    No Growth after 48 hours


 


 12/05/18 18:36 Blood Culture - Preliminary





 Blood    No Growth after 48 hours














Assessment and Plan


Assessment: 





1. Metastatic NSCLC


2. Febrile neutropenia


3. Electrolyte abnormality


4. Dehydration


5. Pancytopenia due to chemotherapy





Plan: 





Mr. Munoz is a very pleasant 68-year-old gentleman with a history of 

metastatic non-small cell lung cancer, recently started on Taxotere, here for 

febrile neutropenia and dehydration.  Neutropenia has recovered.  He has been 

afebrile.  He did have electrolyte deficits due to his dehydration and 

decreased by mouth intake which have improved.  Also with mucositis slowly 

improving with Magic mouthwash.  He was ready for discharge yesterday however 

this was delayed due to headaches and difficulty swallowing.  Swallow 

evaluation negative for aspiration risk.  MRI brain negative for possible 

metastases.  He's otherwise been stable.  Plan on discharge today.  Patient is 

agreeable to this.

## 2018-12-10 NOTE — CDI
Documentation Clarification Form



Date: 12/17/18

From: Emma Noriega/Cyn Bernard

Phone: If you have a question , please contact Sarahyossi Grahamchloe at [phone] 
between 8am and 5pm.

MRN: [id number]

Admit Date: 12/5/2018 5:07:00 PM

Patient Name: Lacy Munoz

Visit Number: [id number]

Discharge Date:  12/8/2018 7:00:00 PM





ATTENTION: The Clinical Documentation Specialists (CDI) and [facility] Coding Staff 
appreciate your assistance in clarifying documentation. Please respond to the 
clarification below the line at the bottom and electronically sign. The CDI & 
[facility] Coding staff will review the response and follow-up if needed. Please note: 
Queries are made part of the Legal Health Record. If you have any questions, 
please contact the author of this message via ITS.



Dr. Hastings,



Mental status changes with concern for metastasis is documented in your Dr. Cabello 
12/7 progress note.



History/Risk Factors:  Patient has a diagnosis of squamous cell carcinoma of 
the lung and was admitted with febrile neutropenia, chemo induced pancytopenia 
and dehydreation.

Clinical Indicators: mental status changes.



MR of brain w/o contrast:  1. No acute intracranial abnormality.  2. 
Nonspecific flair lesions in the subcortical white matter as well as the deep 
white matter tracts of both cerebral hemispheres.  Differential diagnosis 
includes small vessel disease, demyelination, hypertension, migraine headaches 
and Lyme's disease.





In your professional opinion, please clarify the etiology of the Altered Mental 
Status, if known.



Delirium (specify cause): ___________________________________

Dementia (if know, specify Type and if with/without Behavioral Disturbance) ____
___

Encephalopathy (specify Type and Underlying Medical Illness)___________

Other condition (please specify)_________________

Unable to determine

___________________________________________________________________________

I did not see this patient so I am unsure how to answer or why I keep getting

messages related to patients I have not seen.

MTDD